# Patient Record
Sex: FEMALE | Race: WHITE | NOT HISPANIC OR LATINO | Employment: OTHER | URBAN - METROPOLITAN AREA
[De-identification: names, ages, dates, MRNs, and addresses within clinical notes are randomized per-mention and may not be internally consistent; named-entity substitution may affect disease eponyms.]

---

## 2018-09-24 ENCOUNTER — APPOINTMENT (OUTPATIENT)
Dept: WOMENS IMAGING | Facility: HOSPITAL | Age: 68
End: 2018-09-24

## 2018-09-24 PROCEDURE — 77067 SCR MAMMO BI INCL CAD: CPT | Performed by: RADIOLOGY

## 2018-09-24 PROCEDURE — 77063 BREAST TOMOSYNTHESIS BI: CPT | Performed by: RADIOLOGY

## 2018-10-11 ENCOUNTER — TELEPHONE (OUTPATIENT)
Dept: GASTROENTEROLOGY | Facility: CLINIC | Age: 68
End: 2018-10-11

## 2018-10-11 ENCOUNTER — OFFICE VISIT (OUTPATIENT)
Dept: GASTROENTEROLOGY | Facility: CLINIC | Age: 68
End: 2018-10-11

## 2018-10-11 VITALS
HEIGHT: 60 IN | TEMPERATURE: 99 F | BODY MASS INDEX: 30.04 KG/M2 | SYSTOLIC BLOOD PRESSURE: 132 MMHG | WEIGHT: 153 LBS | DIASTOLIC BLOOD PRESSURE: 74 MMHG

## 2018-10-11 DIAGNOSIS — R10.11 RUQ PAIN: Primary | ICD-10-CM

## 2018-10-11 DIAGNOSIS — Z79.1 LONG TERM (CURRENT) USE OF NON-STEROIDAL ANTI-INFLAMMATORIES (NSAID): ICD-10-CM

## 2018-10-11 LAB
ALBUMIN SERPL-MCNC: 4.9 G/DL (ref 3.5–5.2)
ALBUMIN/GLOB SERPL: 2.7 G/DL
ALP SERPL-CCNC: 83 U/L (ref 39–117)
ALT SERPL-CCNC: 19 U/L (ref 1–33)
AST SERPL-CCNC: 19 U/L (ref 1–32)
BASOPHILS # BLD AUTO: 0.06 10*3/MM3 (ref 0–0.2)
BASOPHILS NFR BLD AUTO: 1 % (ref 0–1.5)
BILIRUB SERPL-MCNC: 0.4 MG/DL (ref 0.1–1.2)
BUN SERPL-MCNC: 15 MG/DL (ref 8–23)
BUN/CREAT SERPL: 27.8 (ref 7–25)
CALCIUM SERPL-MCNC: 10 MG/DL (ref 8.6–10.5)
CHLORIDE SERPL-SCNC: 102 MMOL/L (ref 98–107)
CO2 SERPL-SCNC: 25.5 MMOL/L (ref 22–29)
CREAT SERPL-MCNC: 0.54 MG/DL (ref 0.57–1)
EOSINOPHIL # BLD AUTO: 0.24 10*3/MM3 (ref 0–0.7)
EOSINOPHIL NFR BLD AUTO: 4.2 % (ref 0.3–6.2)
ERYTHROCYTE [DISTWIDTH] IN BLOOD BY AUTOMATED COUNT: 12.3 % (ref 11.7–13)
GLOBULIN SER CALC-MCNC: 1.8 GM/DL
GLUCOSE SERPL-MCNC: 96 MG/DL (ref 65–99)
HCT VFR BLD AUTO: 41.3 % (ref 35.6–45.5)
HGB BLD-MCNC: 13.1 G/DL (ref 11.9–15.5)
IMM GRANULOCYTES # BLD: 0.03 10*3/MM3 (ref 0–0.03)
IMM GRANULOCYTES NFR BLD: 0.5 % (ref 0–0.5)
LIPASE SERPL-CCNC: 48 U/L (ref 13–60)
LYMPHOCYTES # BLD AUTO: 1.4 10*3/MM3 (ref 0.9–4.8)
LYMPHOCYTES NFR BLD AUTO: 24.3 % (ref 19.6–45.3)
MCH RBC QN AUTO: 28.8 PG (ref 26.9–32)
MCHC RBC AUTO-ENTMCNC: 31.7 G/DL (ref 32.4–36.3)
MCV RBC AUTO: 90.8 FL (ref 80.5–98.2)
MONOCYTES # BLD AUTO: 0.45 10*3/MM3 (ref 0.2–1.2)
MONOCYTES NFR BLD AUTO: 7.8 % (ref 5–12)
NEUTROPHILS # BLD AUTO: 3.58 10*3/MM3 (ref 1.9–8.1)
NEUTROPHILS NFR BLD AUTO: 62.2 % (ref 42.7–76)
PLATELET # BLD AUTO: 269 10*3/MM3 (ref 140–500)
POTASSIUM SERPL-SCNC: 4.5 MMOL/L (ref 3.5–5.2)
PROT SERPL-MCNC: 6.7 G/DL (ref 6–8.5)
RBC # BLD AUTO: 4.55 10*6/MM3 (ref 3.9–5.2)
SODIUM SERPL-SCNC: 142 MMOL/L (ref 136–145)
WBC # BLD AUTO: 5.76 10*3/MM3 (ref 4.5–10.7)

## 2018-10-11 PROCEDURE — 99204 OFFICE O/P NEW MOD 45 MIN: CPT | Performed by: INTERNAL MEDICINE

## 2018-10-11 RX ORDER — OMEGA-3S/DHA/EPA/FISH OIL/D3 300MG-1000
800 CAPSULE ORAL DAILY
COMMUNITY

## 2018-10-11 RX ORDER — CETIRIZINE HYDROCHLORIDE 5 MG/1
5 TABLET ORAL
COMMUNITY

## 2018-10-11 RX ORDER — SUCRALFATE ORAL 1 G/10ML
1 SUSPENSION ORAL 4 TIMES DAILY
Qty: 1200 ML | Refills: 1 | Status: SHIPPED | OUTPATIENT
Start: 2018-10-11 | End: 2018-10-22 | Stop reason: HOSPADM

## 2018-10-11 RX ORDER — FENOFIBRATE 54 MG/1
54 TABLET ORAL
COMMUNITY
Start: 2018-03-07

## 2018-10-11 RX ORDER — GABAPENTIN 800 MG/1
400 TABLET ORAL
COMMUNITY
Start: 2018-09-25 | End: 2019-09-25

## 2018-10-11 NOTE — PROGRESS NOTES
"Chief Complaint   Patient presents with   • Abdominal Pain     Denis Lowe is a 68 y.o. female who presents with abdominal pain. This started 2 weeks ago.  Began at rest.  Started in her back and radiated to the ruq and down the right side.  Nothing seems to exacerbate it - it just hurts.  Worse as the day goes on.  A little worse with eating.  Doesn't feel like acid reflux.    U/s [facility] showed left ovarian cyst.  She takes a lot of ibuprofen.  No change in BM - no blood in stool.  HPI  Past Medical History:   Diagnosis Date   • Allergic rhinitis    • Family hx of colon cancer    • Sciatica      Past Surgical History:   Procedure Laterality Date   • COLONOSCOPY  approx 2016    normal per patient-repeat 10 years   • HYSTERECTOMY     • KNEE SURGERY Right    • SHOULDER SURGERY Right    • WISDOM TOOTH EXTRACTION         Current Outpatient Prescriptions:   •  calcium carbonate-cholecalciferol 500-400 MG-UNIT tablet tablet, Take  by mouth Daily., Disp: , Rfl:   •  cetirizine (zyrTEC) 5 MG tablet, Take 5 mg by mouth., Disp: , Rfl:   •  cholecalciferol (VITAMIN D3) 400 units tablet, Take 800 Units by mouth Daily., Disp: , Rfl:   •  fenofibrate (TRICOR) 54 MG tablet, Take 54 mg by mouth., Disp: , Rfl:   •  gabapentin (NEURONTIN) 800 MG tablet, Take 400 mg by mouth., Disp: , Rfl:   •  sucralfate (CARAFATE) 1 GM/10ML suspension, Take 10 mL by mouth 4 (Four) Times a Day., Disp: 1200 mL, Rfl: 1  •  VITAMIN D, CHOLECALCIFEROL, PO, Take  by mouth., Disp: , Rfl:   Allergies   Allergen Reactions   • Codeine Other (See Comments)     \"lung spasms\"   • Penicillins Rash     Social History     Social History   • Marital status: Unknown     Spouse name: N/A   • Number of children: N/A   • Years of education: N/A     Occupational History   • Not on file.     Social History Main Topics   • Smoking status: Former Smoker     Packs/day: 0.50     Types: Cigarettes     Start date: 1973     Quit date: 1985   • Smokeless tobacco: Never " Used   • Alcohol use 1.2 oz/week     2 Shots of liquor per week   • Drug use: No   • Sexual activity: Not on file     Other Topics Concern   • Not on file     Social History Narrative   • No narrative on file     Family History   Problem Relation Age of Onset   • Colon cancer Mother    • Bone cancer Brother    • Ulcerative colitis Other    • Lymphoma Brother    • Cancer Daughter    • Cancer Son      Review of Systems   Constitutional: Negative for appetite change and unexpected weight change.   Gastrointestinal: Positive for abdominal pain. Negative for blood in stool, constipation, diarrhea, nausea and vomiting.   Musculoskeletal: Positive for back pain.   All other systems reviewed and are negative.    Vitals:    10/11/18 0837   BP: 132/74   Temp: 99 °F (37.2 °C)     1    10/11/18  0837   Weight: 69.4 kg (153 lb)     Physical Exam   Constitutional: She appears well-developed and well-nourished.   HENT:   Head: Normocephalic and atraumatic.   Eyes: No scleral icterus.   Abdominal: Soft. She exhibits no distension and no mass. There is tenderness.       Neurological: She is alert.   Skin: Skin is warm and dry.   Psychiatric: She has a normal mood and affect.     No images are attached to the encounter.  No notes on file  Denis was seen today for abdominal pain.    Diagnoses and all orders for this visit:    RUQ pain  -     Case Request; Standing  -     Case Request  -     CBC & Differential  -     Comprehensive Metabolic Panel  -     Lipase    Long term (current) use of non-steroidal anti-inflammatories (nsaid)    Other orders  -     sucralfate (CARAFATE) 1 GM/10ML suspension; Take 10 mL by mouth 4 (Four) Times a Day.  -     Follow Anesthesia Guidelines / Standing Orders; Future  -     Implement Anesthesia orders day of procedure.; Standing  -     Obtain informed consent; Standing    Plan:  - Patient advised to stop NSAIDs and use Tylenol  -Request ultrasound from [facility]  -Labs today  -Trial Carafate  and will plan for an EGD for further evaluation to rule out peptic ulcer disease related to NSAIDs.  If negative she will need a CT scan of abdomen

## 2018-10-22 ENCOUNTER — HOSPITAL ENCOUNTER (OUTPATIENT)
Facility: HOSPITAL | Age: 68
Setting detail: HOSPITAL OUTPATIENT SURGERY
Discharge: HOME | End: 2018-10-22
Attending: INTERNAL MEDICINE | Admitting: INTERNAL MEDICINE

## 2018-10-22 ENCOUNTER — ANESTHESIA EVENT (OUTPATIENT)
Dept: GASTROENTEROLOGY | Facility: HOSPITAL | Age: 68
End: 2018-10-22

## 2018-10-22 ENCOUNTER — ANESTHESIA (OUTPATIENT)
Dept: GASTROENTEROLOGY | Facility: HOSPITAL | Age: 68
End: 2018-10-22

## 2018-10-22 VITALS
DIASTOLIC BLOOD PRESSURE: 83 MMHG | WEIGHT: 150.3 LBS | TEMPERATURE: 97.9 F | HEIGHT: 60 IN | OXYGEN SATURATION: 99 % | HEART RATE: 70 BPM | RESPIRATION RATE: 16 BRPM | BODY MASS INDEX: 29.51 KG/M2 | SYSTOLIC BLOOD PRESSURE: 122 MMHG

## 2018-10-22 DIAGNOSIS — R10.11 RUQ PAIN: ICD-10-CM

## 2018-10-22 PROCEDURE — 25010000002 PROPOFOL 10 MG/ML EMULSION: Performed by: ANESTHESIOLOGY

## 2018-10-22 PROCEDURE — 88312 SPECIAL STAINS GROUP 1: CPT | Performed by: INTERNAL MEDICINE

## 2018-10-22 PROCEDURE — S0260 H&P FOR SURGERY: HCPCS | Performed by: INTERNAL MEDICINE

## 2018-10-22 PROCEDURE — 43239 EGD BIOPSY SINGLE/MULTIPLE: CPT | Performed by: INTERNAL MEDICINE

## 2018-10-22 PROCEDURE — 88305 TISSUE EXAM BY PATHOLOGIST: CPT | Performed by: INTERNAL MEDICINE

## 2018-10-22 RX ORDER — PROPOFOL 10 MG/ML
VIAL (ML) INTRAVENOUS AS NEEDED
Status: DISCONTINUED | OUTPATIENT
Start: 2018-10-22 | End: 2018-10-22 | Stop reason: SURG

## 2018-10-22 RX ORDER — LIDOCAINE HYDROCHLORIDE 20 MG/ML
INJECTION, SOLUTION INFILTRATION; PERINEURAL AS NEEDED
Status: DISCONTINUED | OUTPATIENT
Start: 2018-10-22 | End: 2018-10-22 | Stop reason: SURG

## 2018-10-22 RX ORDER — SUCRALFATE ORAL 1 G/10ML
1 SUSPENSION ORAL 4 TIMES DAILY
Qty: 1200 ML | Refills: 0 | Status: SHIPPED | OUTPATIENT
Start: 2018-10-22 | End: 2018-11-02 | Stop reason: ALTCHOICE

## 2018-10-22 RX ORDER — SUCRALFATE 1 G/1
1 TABLET ORAL
Status: DISCONTINUED | OUTPATIENT
Start: 2018-10-22 | End: 2018-10-22 | Stop reason: HOSPADM

## 2018-10-22 RX ORDER — OMEPRAZOLE 20 MG/1
20 CAPSULE, DELAYED RELEASE ORAL 2 TIMES DAILY
Qty: 60 CAPSULE | Refills: 1 | Status: SHIPPED | OUTPATIENT
Start: 2018-10-22 | End: 2018-12-21

## 2018-10-22 RX ORDER — SODIUM CHLORIDE, SODIUM LACTATE, POTASSIUM CHLORIDE, CALCIUM CHLORIDE 600; 310; 30; 20 MG/100ML; MG/100ML; MG/100ML; MG/100ML
1000 INJECTION, SOLUTION INTRAVENOUS CONTINUOUS
Status: DISCONTINUED | OUTPATIENT
Start: 2018-10-22 | End: 2018-10-22 | Stop reason: HOSPADM

## 2018-10-22 RX ADMIN — PROPOFOL 150 MG: 10 INJECTION, EMULSION INTRAVENOUS at 14:17

## 2018-10-22 RX ADMIN — LIDOCAINE HYDROCHLORIDE 50 MG: 20 INJECTION, SOLUTION INFILTRATION; PERINEURAL at 14:17

## 2018-10-22 RX ADMIN — PROPOFOL 20 MG: 10 INJECTION, EMULSION INTRAVENOUS at 14:25

## 2018-10-22 RX ADMIN — PROPOFOL 100 MG: 10 INJECTION, EMULSION INTRAVENOUS at 14:21

## 2018-10-22 RX ADMIN — SODIUM CHLORIDE, POTASSIUM CHLORIDE, SODIUM LACTATE AND CALCIUM CHLORIDE 1000 ML: 600; 310; 30; 20 INJECTION, SOLUTION INTRAVENOUS at 13:50

## 2018-10-22 NOTE — H&P
[facility]  Pre Procedure History & Physical    Chief Complaint:   Abdominal pain    Subjective     HPI:   Denis Lowe is a 68 y.o. female who presents with abdominal pain. This started 2 weeks ago.  Began at rest.  Started in her back and radiated to the ruq and down the right side.  Nothing seems to exacerbate it - it just hurts.  Worse as the day goes on.  A little worse with eating.  Doesn't feel like acid reflux.    U/[facility] showed left ovarian cyst.  She takes a lot of ibuprofen.  No change in BM - no blood in stool.    Past Medical History:   Past Medical History:   Diagnosis Date   • Allergic rhinitis    • Elevated cholesterol    • Family hx of colon cancer    • Sciatica        Past Surgical History:  Past Surgical History:   Procedure Laterality Date   • COLONOSCOPY  approx 2016    normal per patient-repeat 10 years   • HYSTERECTOMY     • KNEE SURGERY Right    • SHOULDER SURGERY Right    • WISDOM TOOTH EXTRACTION         Family History:  Family History   Problem Relation Age of Onset   • Colon cancer Mother    • Bone cancer Brother    • Ulcerative colitis Other    • Lymphoma Brother    • Cancer Daughter    • Cancer Son        Social History:   reports that she quit smoking about 33 years ago. Her smoking use included Cigarettes. She started smoking about 45 years ago. She smoked 0.50 packs per day. She has never used smokeless tobacco. She reports that she drinks about 1.2 oz of alcohol per week . She reports that she does not use drugs.    Medications:   Prescriptions Prior to Admission   Medication Sig Dispense Refill Last Dose   • cetirizine (zyrTEC) 5 MG tablet Take 5 mg by mouth.   Past Week at Unknown time   • cholecalciferol (VITAMIN D3) 400 units tablet Take 800 Units by mouth Daily.   Past Month at Unknown time   • fenofibrate (TRICOR) 54 MG tablet Take 54 mg by mouth.   10/21/2018 at Unknown time   • gabapentin (NEURONTIN) 800 MG tablet Take 400 mg by mouth.    "10/21/2018 at Unknown time   • VITAMIN D, CHOLECALCIFEROL, PO Take  by mouth.   Past Week at Unknown time   • calcium carbonate-cholecalciferol 500-400 MG-UNIT tablet tablet Take  by mouth Daily.   10/20/2018   • sucralfate (CARAFATE) 1 GM/10ML suspension Take 10 mL by mouth 4 (Four) Times a Day. 1200 mL 1 Unknown at Unknown time       Allergies:  Codeine and Penicillins    ROS:    Pertinent items are noted in HPI, all other systems reviewed and negative     Objective     Blood pressure (!) 185/82, pulse 82, temperature 97.6 °F (36.4 °C), temperature source Oral, resp. rate 18, height 152.4 cm (60\"), weight 68.2 kg (150 lb 4.8 oz), SpO2 98 %.    Physical Exam   Constitutional: Pt is oriented to person, place, and time and well-developed, well-nourished, and in no distress.   Mouth/Throat: Oropharynx is clear and moist.   Neck: Normal range of motion.   Cardiovascular: Normal rate, regular rhythm      Pulmonary/Chest: Effort normal     Abdominal: Soft. Nontender  Skin: Skin is warm and dry.   Psychiatric: Mood, memory, affect and judgment normal.     Assessment/Plan     Diagnosis:  Abdominal pain    Anticipated Surgical Procedure:  egd    The risks, benefits, and alternatives of this procedure have been discussed with the patient or the responsible party- the patient understands and agrees to proceed.                                                            "

## 2018-10-22 NOTE — ANESTHESIA PREPROCEDURE EVALUATION
Anesthesia Evaluation     Patient summary reviewed and Nursing notes reviewed   no history of anesthetic complications:  NPO Solid Status: > 8 hours  NPO Liquid Status: > 6 hours           Airway   Mallampati: II  TM distance: >3 FB  Neck ROM: full  No difficulty expected  Dental      Comment: Permanent [place]    Pulmonary - negative pulmonary ROS and normal exam    breath sounds clear to auscultation  Cardiovascular - normal exam    Rhythm: regular  Rate: normal    (+) hyperlipidemia,       Neuro/Psych    ROS Comment: sciatica  GI/Hepatic/Renal/Endo      Musculoskeletal         ROS comment: sciatica  Abdominal  - normal exam   Substance History - negative use     OB/GYN negative ob/gyn ROS         Other - negative ROS                     Anesthesia Plan    ASA 2     MAC     intravenous induction   Anesthetic plan, all risks, benefits, and alternatives have been provided, discussed and informed consent has been obtained with: patient.

## 2018-10-22 NOTE — ANESTHESIA POSTPROCEDURE EVALUATION
Patient: Denis Lowe    Procedure Summary     Date:  10/22/18 Room / Location:   [facility]    Anesthesia Start:  1411 Anesthesia Stop:  1436    Procedure:  ESOPHAGOGASTRODUODENOSCOPY WITH COLD BIOPSIES (N/A Esophagus) Diagnosis:       RUQ pain      (RUQ pain [R10.11])    Surgeon:  Laure Hollins MD Provider:  Ruma Morales MD    Anesthesia Type:  MAC ASA Status:  2          Anesthesia Type: MAC  Last vitals  BP   101/68 (10/22/18 1437)   Temp   36.4 °C (97.6 °F) (10/22/18 1317)   Pulse   73 (10/22/18 1437)   Resp   16 (10/22/18 1437)     SpO2   98 % (10/22/18 1437)     Post Anesthesia Care and Evaluation    Patient location during evaluation: PACU  Patient participation: complete - patient participated  Level of consciousness: awake and alert  Pain management: adequate  Airway patency: patent  Anesthetic complications: No anesthetic complications  PONV Status: none  Cardiovascular status: acceptable  Respiratory status: acceptable  Hydration status: acceptable

## 2018-10-22 NOTE — DISCHARGE INSTRUCTIONS
For the next 24 hours patient needs to be with a responsible adult.    For 24 hours DO NOT drive, operate machinery, appliances, drink alcohol, make important decisions or sign legal documents.    Start with a light or bland diet and advance to regular diet as tolerated.    Follow recommendations on procedure report provided by your doctor.    Call Dr Hollins for problems [phone] and for biopsy results in 7-10 days    Problems may include but not limited to: large amounts of bleeding, trouble breathing, repeated vomiting, severe unrelieved pain, fever or chills.

## 2018-10-23 LAB
CYTO UR: NORMAL
LAB AP CASE REPORT: NORMAL
PATH REPORT.FINAL DX SPEC: NORMAL
PATH REPORT.GROSS SPEC: NORMAL

## 2018-11-01 ENCOUNTER — TELEPHONE (OUTPATIENT)
Dept: GASTROENTEROLOGY | Facility: CLINIC | Age: 68
End: 2018-11-01

## 2018-11-01 NOTE — TELEPHONE ENCOUNTER
Bx show inflammation in the duodenum - normal gastric bx - cont meds as prescribed at time of egd - office f/u with criss 4-6 weeks

## 2018-11-02 RX ORDER — SUCRALFATE 1 G/1
1 TABLET ORAL 4 TIMES DAILY
Qty: 120 TABLET | Refills: 0 | Status: SHIPPED | OUTPATIENT
Start: 2018-11-02

## 2018-11-02 NOTE — TELEPHONE ENCOUNTER
Call to pt.  Advise per Dr Hollins that bx show inflammation in the duodenum.  Normal gastric bx.  Continues meds as prescribed at time of egd.  Office f/u in 4-6 weeks.    Pt verb understanding.  States did not fill sucralfate elixir because too expensive.  Advise that will change to tabs - may crush and mix with water.  This much less expensive.  Pt agreeable.      F/u appt scheduled with TYRONE Banegas for 12/7 @ 1:30 pm.    Escribe completed for Sucralfate tabs 1 gm - crush and mix with 10 ml water.  Take 4x/day for 30 days, #120, R0.

## 2019-09-25 ENCOUNTER — APPOINTMENT (OUTPATIENT)
Dept: WOMENS IMAGING | Facility: HOSPITAL | Age: 69
End: 2019-09-25

## 2019-09-25 PROCEDURE — 77067 SCR MAMMO BI INCL CAD: CPT | Performed by: RADIOLOGY

## 2021-08-03 ENCOUNTER — APPOINTMENT (OUTPATIENT)
Dept: WOMENS IMAGING | Facility: HOSPITAL | Age: 71
End: 2021-08-03

## 2021-08-03 PROCEDURE — 77067 SCR MAMMO BI INCL CAD: CPT | Performed by: RADIOLOGY

## 2021-08-03 PROCEDURE — 77063 BREAST TOMOSYNTHESIS BI: CPT | Performed by: RADIOLOGY

## 2024-02-15 ENCOUNTER — OFFICE VISIT (OUTPATIENT)
Dept: ORTHOPEDIC SURGERY | Facility: CLINIC | Age: 74
End: 2024-02-15
Payer: MEDICARE

## 2024-02-15 VITALS — HEIGHT: 60 IN | BODY MASS INDEX: 29.61 KG/M2 | TEMPERATURE: 97.3 F | WEIGHT: 150.8 LBS

## 2024-02-15 DIAGNOSIS — M72.2 PLANTAR FASCIITIS: Primary | ICD-10-CM

## 2024-02-15 DIAGNOSIS — M79.672 FOOT PAIN, LEFT: ICD-10-CM

## 2024-02-15 DIAGNOSIS — M65.28 CALCIFIC ACHILLES TENDONITIS: ICD-10-CM

## 2024-02-15 PROCEDURE — 99204 OFFICE O/P NEW MOD 45 MIN: CPT | Performed by: ORTHOPAEDIC SURGERY

## 2024-02-15 RX ORDER — GABAPENTIN 400 MG/1
400 CAPSULE ORAL 3 TIMES DAILY
COMMUNITY

## 2024-02-15 RX ORDER — LANOLIN ALCOHOL/MO/W.PET/CERES
1000 CREAM (GRAM) TOPICAL DAILY
COMMUNITY

## 2024-02-15 RX ORDER — LISINOPRIL 10 MG/1
10 TABLET ORAL DAILY
COMMUNITY
Start: 2023-08-09 | End: 2024-08-08

## 2024-03-13 ENCOUNTER — TREATMENT (OUTPATIENT)
Age: 74
End: 2024-03-13
Payer: MEDICARE

## 2024-03-13 DIAGNOSIS — R29.898 ANKLE WEAKNESS: ICD-10-CM

## 2024-03-13 DIAGNOSIS — M72.2 PLANTAR FASCIITIS OF LEFT FOOT: ICD-10-CM

## 2024-03-13 DIAGNOSIS — M79.672 PAIN IN LEFT FOOT: Primary | ICD-10-CM

## 2024-03-13 NOTE — PROGRESS NOTES
Physical Therapy Initial Evaluation and Plan of Care  [address]  [facility] [address]  P: [phone]  F: [phone]    Patient: Denis Lowe   : 1950  Diagnosis/ICD-10 Code:  Pain in left foot [M79.672]  Referring practitioner: Solis Wallace*  Date of Initial Visit: 3/13/2024  Today's Date: 3/13/2024  Patient seen for 1 session         Visit Diagnoses:    ICD-10-CM ICD-9-CM   1. Pain in left foot  M79.672 729.5   2. Plantar fasciitis of left foot  M72.2 728.71   3. Ankle weakness  R29.898 719.67         Subjective Questionnaire: LEFS:       Subjective Evaluation    History of Present Illness  Mechanism of injury: Pt is a 72 y/o female who presents to physical therapy with complaints of L heel and foot pain that began in January and it started all of a sudden and she doesn't recall a DAPHNIE. Pt reports that she had a similar issue 5 years ago and that she had an injection which helped. She wants to avoid another injection. She reports pain first thing in the morning, if she sits for a while, or if she stands for a long time. In addition, she reports she has pain if she walks on it. Pt reports that with time and doing her stretches since seeing her doctor she is starting to feel better, her pain used to be a 10/10 and now she reports it ranges from a 2-6/10.      Patient Occupation: Part Time- [employer]  Current pain ratin  At worst pain ratin  Quality: dull ache, tight and sharp  Relieving factors: ice, medications, rest and change in position  Aggravating factors: stairs, standing, movement, ambulation and prolonged positioning    Social Support  Lives in: multiple-level home    Diagnostic Tests  Abnormal x-ray: flattening of the second metatarsal head.  There are some arthritis at the second and third tarsometatarsal joints.There is small spur at the insertion of the Achilles and plantarly.A small loose ossific fragment in far posterior  [facility].    Treatments  Current treatment: physical therapy  Patient Goals  Patient goals for therapy: increased strength, independence with ADLs/IADLs, increased motion, improved balance and decreased pain             Objective          Tenderness   Left Ankle/Foot   Tenderness in the Achilles insertion, lateral malleolus, medial calcaneus and plantar fascia.     Neurological Testing     Sensation     Ankle/Foot   Left Ankle/Foot   Intact: light touch    Right Ankle/Foot   Intact: light touch     Active Range of Motion   Left Ankle/Foot   Plantar flexion: WFL  Inversion: 30 degrees   Eversion: 6 degrees     Right Ankle/Foot   Normal active range of motion    Additional Active Range of Motion Details  DF to neutral on L    Passive Range of Motion   Left Ankle/Foot    Plantar flexion: WFL  Inversion: 34 degrees   Eversion: 8 degrees     Additional Passive Range of Motion Details  DF to just past neutral on L    Strength/Myotome Testing     Left Ankle/Foot   Dorsiflexion: 5  Plantar flexion: 4  Inversion: 3+  Eversion: 3+    Right Ankle/Foot   Dorsiflexion: 5  Plantar flexion: 5  Inversion: 5  Eversion: 5    Tests   Left Ankle/Foot   Positive for calcaneal squeeze.     Ambulation   Weight-Bearing Status   Weight-Bearing Status (Left): full weight bearing   Weight-Bearing Status (Right): full weight-bearing      Ambulation: Level Surfaces   Ambulation without assistive device: independent    Ambulation: Stairs   Ascend stairs: independent  Pattern: non-reciprocal  Railings: one rail  Descend stairs: independent  Pattern: non-reciprocal  Railings: one rail    Observational Gait   Gait: antalgic   Increased right stance time and left swing time. Decreased walking speed, stride length, left stance time and right swing time.           Assessment & Plan       Assessment  Impairments: abnormal gait, abnormal muscle tone, abnormal or restricted ROM, activity intolerance, impaired balance, impaired physical  strength, lacks appropriate home exercise program, pain with function and weight-bearing intolerance   Functional limitations: carrying objects, lifting, walking, uncomfortable because of pain, standing and unable to perform repetitive tasks   Assessment details: Pt is a 72 y/o female who presents to physical therapy with signs and symptoms consistent with L foot/heel pain most consistent with plantar fasciitis. Pt has decreased L ankle ROM, with the most limitation in ankle dorsiflexion and eversion. In addition, pt has L LE strength deficits which limits her ability to perform her ADLs/IADLs pain free. Pt has pain and difficulty performing prolonged standing, prolonged sitting, and walking. Pt would benefit from skilled physical therapy in order to address the above impairments and activity limitations outlined in this initial evaluation, in order to decrease pain, improve functional mobility, and return to PLOF.   Prognosis: good    Goals  Plan Goals: STG 2-4 weeks    1. LEFS score to increase by 10 points in order to show improvement in overall functional activities.   2. Pt will be able to improve ankle ROM to 5 degrees or greater of L ankle dorsiflexion in order to improve gait and stair negotiation  3. Pt will be independent in home exercise program    LTG 4-8 weeks    1. Pt will be able to ambulate 1.0-1.5 mph for 1/4 mile pain free and with improved stance time on L foot  2. Pt will improve L ankle MMT to 4+/5 or greater to improve stability when ambulating on level and uneven surfaces  3. Pt will report pain free sitting for up to one hour while reading   4. LEFS score to increase by 15 points in order to show improvement in overall functional activities.       Plan  Therapy options: will be seen for skilled therapy services  Planned modality interventions: cryotherapy, dry needling, iontophoresis and ultrasound  Planned therapy interventions: balance/weight-bearing training, flexibility, functional ROM  exercises, ADL retraining, home exercise program, gait training, IADL retraining, joint mobilization, manual therapy, neuromuscular re-education, soft tissue mobilization, strengthening, stretching and therapeutic activities  Frequency: 1x week  Duration in weeks: 8  Treatment plan discussed with: patient  Plan details: 1-2 x a week for 6-8 weeks            Timed:         Manual Therapy:    15     mins  98162;     Therapeutic Exercise:    10     mins  25342;     Neuromuscular Gifty:    0    mins  46477;    Therapeutic Activity:     0     mins  05501;     Gait Trainin     mins  84443;     Ultrasound:     0     mins  71821;    Ionto                               0    mins   43317  Self Care                       0     mins   84044  Canalith Repos    0     mins 95621      Un-Timed:  Electrical Stimulation:    0     mins  52387 ( );  Dry Needling     0     mins self-pay  Traction     0     mins 72032        Timed Treatment:   25   mins   Total Treatment:     45   mins          PT: Suzy Torres PT     License Number: [id number]  Electronically signed by Suzy Torres PT, 24, 11:25 AM EDT    Certification Period: 3/13/2024 thru 6/10/2024  I certify that the therapy services are furnished while this patient is under my care.  The services outlined above are required by this patient, and will be reviewed every 90 days.         Physician Signature:__________________________________________________    PHYSICIAN: Solis Wallace MD  NPI: [id number]                                      DATE:      Please sign and return via fax to [phone]  Thank you, [facility].

## 2024-03-27 ENCOUNTER — TREATMENT (OUTPATIENT)
Age: 74
End: 2024-03-27
Payer: MEDICARE

## 2024-03-27 DIAGNOSIS — M79.672 PAIN IN LEFT FOOT: Primary | ICD-10-CM

## 2024-03-27 DIAGNOSIS — R29.898 ANKLE WEAKNESS: ICD-10-CM

## 2024-03-27 DIAGNOSIS — M72.2 PLANTAR FASCIITIS OF LEFT FOOT: ICD-10-CM

## 2024-03-27 PROCEDURE — 97535 SELF CARE MNGMENT TRAINING: CPT | Performed by: PHYSICAL THERAPIST

## 2024-03-27 PROCEDURE — 97110 THERAPEUTIC EXERCISES: CPT | Performed by: PHYSICAL THERAPIST

## 2024-03-27 PROCEDURE — 97140 MANUAL THERAPY 1/> REGIONS: CPT | Performed by: PHYSICAL THERAPIST

## 2024-03-27 PROCEDURE — 97530 THERAPEUTIC ACTIVITIES: CPT | Performed by: PHYSICAL THERAPIST

## 2024-04-01 NOTE — PROGRESS NOTES
Physical Therapy Daily Treatment Note    [facility]  [address]  [place] [address]     Patient: Denis Lowe   : 1950  Referring practitioner: Solis Wallace*  Date of Initial Visit: Type: THERAPY  Noted: 3/13/2024  Today's Date 24  Patient seen for 2 sessions         Denis Lowe reports:         Subjective     Objective   See Exercise, Manual, and Modality Logs for complete treatment.   Exercise rationale/ pain free exercise performance  Anatomy and structure of affected musculature  Shoe wear/support  Ice application  Sleeping positions with pillows  Alternate exercise positions  Verbal/Tactile cues to ensure correct exercise performance/technique    Added:Nu Step, ROM, stretches,      Assessment/Plan  Compliant/Cooperative with rehab efforts this session.  Subjectively reports no increased symptoms or discomfort with therapeutic exercise today.  Able to perform increased exercise/functional activity without increased L foot/ankle discomfort.  Benefits from verbal/tactile cues to ensure proper exercise technique, performance and positoining.          Progress per Plan of Care and Progress strengthening /stabilization /functional activity           Timed:  Manual Therapy:  15       mins  63553;  Therapeutic Exercise:    20     mins  52931;     Neuromuscular Gifty:        mins  60511;    Therapeutic Activity:     10     mins  61038;     Gait Training:           mins  57052;     Ultrasound:          mins  74607;    Self Care                    _12__      mins 75543    Untimed:  Electrical Stimulation:         mins  68998 ( );  Mechanical Traction:         mins  91903;     Timed Treatment:   57   mins   Total Treatment:    57    mins  Parth Antonio PTA  Physical Therapist  Assistant  [id number]

## 2024-04-02 ENCOUNTER — TREATMENT (OUTPATIENT)
Age: 74
End: 2024-04-02
Payer: MEDICARE

## 2024-04-02 DIAGNOSIS — R29.898 ANKLE WEAKNESS: ICD-10-CM

## 2024-04-02 DIAGNOSIS — M79.672 PAIN IN LEFT FOOT: Primary | ICD-10-CM

## 2024-04-02 DIAGNOSIS — M72.2 PLANTAR FASCIITIS OF LEFT FOOT: ICD-10-CM

## 2024-04-02 NOTE — PROGRESS NOTES
Physical Therapy Daily Treatment Note  [address]  [facility] [address]  P: [phone]  F: [phone]    Patient: Denis Lowe   : 1950  Referring practitioner: Solis Wallace*  Date of Initial Visit: Type: THERAPY  Noted: 3/13/2024  Today's Date: 2024  Patient seen for 3 sessions       Visit Diagnoses:    ICD-10-CM ICD-9-CM   1. Pain in left foot  M79.672 729.5   2. Plantar fasciitis of left foot  M72.2 728.71   3. Ankle weakness  R29.898 719.67       Denis Lowe reports:     Subjective   Pt reports that her foot is feeling much better, but that she recently went to a wedding and had to go up and down several stairs. Pt reports that her left knee is sore today.   Objective   See Exercise, Manual, and Modality Logs for complete treatment.       Assessment/Plan  Pt had difficulty tolerating any plantarflexion based activities for left foot due to c/o left knee pain. Did not perform these interventions as to not exacerbate her knee. Pt tolerated remainder of interventions well with no complaints of L foot pain. Instructed to continue to perform home exercise program and perform stretches in seated position if she has difficulty performing in standing. Continue POC.     Timed:         Manual Therapy:    15     mins  57710;     Therapeutic Exercise:    15     mins  56312;     Neuromuscular Gifty:    0    mins  84681;    Therapeutic Activity:     9     mins  93806;     Gait Trainin     mins  06450;     Ultrasound:     0     mins  40328;    Ionto                               0    mins   19797  Self Care                       0     mins   36000  Canalith Repos    0     mins 72917      Un-Timed:  Electrical Stimulation:    0     mins  69302 ( );  Dry Needling     0     mins self-pay  Traction     0     mins 19688      Timed Treatment:   39   mins   Total Treatment:     39   mins    Suzy Torres PT  [place] License: [id number]

## 2024-04-09 ENCOUNTER — TREATMENT (OUTPATIENT)
Age: 74
End: 2024-04-09
Payer: MEDICARE

## 2024-04-09 DIAGNOSIS — M79.672 PAIN IN LEFT FOOT: Primary | ICD-10-CM

## 2024-04-09 DIAGNOSIS — M72.2 PLANTAR FASCIITIS OF LEFT FOOT: ICD-10-CM

## 2024-04-09 DIAGNOSIS — R29.898 ANKLE WEAKNESS: ICD-10-CM

## 2024-04-09 NOTE — PROGRESS NOTES
"Re-Assessment / Progress Note  [address]  [address]  [facility] [address]  P: [phone]  F: [phone]  Patient: Denis Lowe   : 1950  Diagnosis/ICD-10 Code:  Pain in left foot [M79.672]  Referring practitioner: Solsi Wallace*  Date of Initial Visit: Episode Type: THERAPY  Noted: 3/13/2024    Today's Date: 2024  Patient seen for 4 sessions.    Visit Diagnoses:    ICD-10-CM ICD-9-CM   1. Pain in left foot  M79.672 729.5   2. Plantar fasciitis of left foot  M72.2 728.71   3. Ankle weakness  R29.898 719.67       Subjective:   Denis Lowe reports:   Subjective Questionnaire: LEFS:   Clinical Progress: progressing, but currently worse today due to c/o left knee pain  Home Program Compliance: Yes  Treatment has included: therapeutic exercise, neuromuscular re-education, manual therapy, and therapeutic activity    Subjective   Pt reports that \"there for a while it felt 90%, but today it was back to 40% better\". Pt reports that standing long periods of time is bothersome. Pt reports that her left knee is hurting a lot and she is going to call the doctor about getting this checked out.   Current pain ratin  At worst pain rating: 10  Objective   Tenderness   Left Ankle/Foot   Tenderness to plantar fascia  No TTP to achilles, med/lat malleolus         Active Range of Motion   Left Ankle/Foot   Plantar flexion: WFL  Inversion: 35 degrees   Eversion: 19 degrees         Additional Active Range of Motion Details  DF to neutral on L     Passive Range of Motion   Left Ankle/Foot     Plantar flexion: WFL  Inversion: 40 degrees   Eversion: 23 degrees   Inversion: 40 degrees  Additional Passive Range of Motion Details  DF to 6 degrees with overpressure     Strength/Myotome Testing      Left Ankle/Foot   Dorsiflexion: 5  Plantar flexion: 5  Inversion: 4-  Eversion: 4+       Tests   Left Ankle/Foot   Positive for calcaneal squeeze.         Ambulation: Stairs   Ascend stairs: " independent  Pattern: non-reciprocal  Railings: one rail  Descend stairs: independent  Pattern: non-reciprocal  Railings: one rail     Observational Gait   Gait: antalgic   Significant limp due to c/o left knee pain, decreased stance time on LLE         Assessment/Plan  Pt is a 74 y/o female who has been attending physical therapy for ~1 month for left foot/ankle pain. Pt has made progress in her left ankle range of motion since initial evaluation and has also made improvements in her left ankle strength. She no longer has TTP to medial/lateral malleolus, but continues to have a positive calcaneal squeeze. Pt has been limited in her ability to perform standing interventions due to recent complaints of left knee pain. Pt reports that she went to a wedding and had to do a lot of stairs, and ever since then she has been having knee pain. Pt has significant limp with decreased stance time on the left lower extremity due to c/o L knee pain. LEFS score not reflective of improvements due to this new onset of knee pain, treadmill ambulation not performed/tested today due to her knee pain. She sees MD Thursday for follow up and assessment. She needs continued skilled PT in order to continue to address impairments and activity limitations such as improving her strength and gait in order to improve overall functional mobility.   Progress toward previous goals: Partially Met       Goals  Plan Goals: STG 2-4 weeks     1. LEFS score to increase by 10 points in order to show improvement in overall functional activities. NOT MET  2. Pt will be able to improve ankle ROM to 5 degrees or greater of L ankle dorsiflexion in order to improve gait and stair negotiation MET  3. Pt will be independent in home exercise program MET     LTG 4-8 weeks     1. Pt will be able to ambulate 1.0-1.5 mph for 1/4 mile pain free and with improved stance time on L foot DNT due to knee pain   2. Pt will improve L ankle MMT to 4+/5 or greater to improve  stability when ambulating on level and uneven surfaces progressing  3. Pt will report pain free sitting for up to one hour while reading MET  4. LEFS score to increase by 15 points in order to show improvement in overall functional activities. NOT MET    Recommendations: Continue as planned  Timeframe: 1 month  Prognosis to achieve goals: good    PT Signature: Suzy Sololock, PT  [place] Lic. # [id number]      Based upon review of the patient's progress and continued therapy plan, it is my medical opinion that Denis Lowe should continue physical therapy treatment at [facility] PHYSICAL THERAPY  [address]  [facility] [address] [phone] ; Fax Number [phone]    Signature: __________________________________  Solis Wallace MD    Manual Therapy:     15     mins  71129;  Therapeutic Exercise:     15     mins  74475;     Neuromuscular Gifty:     0    mins  00051;    Therapeutic Activity:      0     mins  05452;     Gait Trainin     mins  22815;     Ultrasound:      0     mins  67940;    Electrical Stimulation:     0     mins  03302 (JORDAN );  Dry Needling      0     mins self-pay  Traction      0     mins 29232  Canalith Repositioning    0     mins 12364      Timed Treatment:   30   mins   Total Treatment:     30   mins

## 2024-04-11 ENCOUNTER — OFFICE VISIT (OUTPATIENT)
Dept: ORTHOPEDIC SURGERY | Facility: CLINIC | Age: 74
End: 2024-04-11
Payer: MEDICARE

## 2024-04-11 VITALS — TEMPERATURE: 96.9 F | HEIGHT: 60 IN | BODY MASS INDEX: 29.92 KG/M2 | WEIGHT: 152.4 LBS

## 2024-04-11 DIAGNOSIS — M72.2 PLANTAR FASCIITIS: ICD-10-CM

## 2024-04-11 DIAGNOSIS — M17.12 PRIMARY LOCALIZED OSTEOARTHROSIS OF LEFT LOWER LEG: Primary | ICD-10-CM

## 2024-04-11 DIAGNOSIS — M65.28 CALCIFIC ACHILLES TENDONITIS: ICD-10-CM

## 2024-04-11 DIAGNOSIS — R52 PAIN: ICD-10-CM

## 2024-04-11 NOTE — PROGRESS NOTES
Ankle Follow Up      Patient: Denis Lowe    YOB: 1950 73 y.o. female    Chief Complaints: Heel is feeling better but knee hurts    History of Present Illness:Patient was seen on 2/15/2024 reporting that she had a history of plantar fasciitis about 5 years  prior and had an injection done by Dr. May with relief. She  had had recurrence of significant symptoms in the left hindfoot about 4 weeks prior to appointment  with stabbing pain in the plantar aspect of her left heel. symptoms were worse when she first got up in the morning and improved some after ambulating but never resolved completely and was exacerbated again after arising from prolonged seated position. She did not describe any numbness or tingling.     I felt like her pain was consistent with exacerbation of previous plantar fasciitis.  She was instructed on use of a night splint and towel stretch as well as heel cord stretching 4-5 times a day.  She was instructed on use of accommodative shoes with arch support ice bottle massage.  He was also referred to physical therapy and prescribed compounding cream.  She instructed to let us know if symptoms did not improve in 4 to 5 weeks we can get an MRI of her left hindfoot prior to follow-up.    Patient is seen back today stating that her heel is feeling quite a bit better.  She has been doing therapy which she says helps and been stretching and using night splint but does still have pain if she is on her heel for more than 30 to 60 minutes.  She does report 75 to 80% relief.    She however reports that she has had a approximate 8-week history of worsening pain over the inferomedial aspect of the left knee without specific injury other than limping on that side due to her heel pain.  Her knee symptoms are much worse than her heel symptoms at this point.  HPI    ROS: ankle pain  Past Medical History:   Diagnosis Date    Allergic rhinitis     Elevated cholesterol     Family hx of colon  "cancer     Sciatica        Physical Exam:   Vitals:    04/11/24 1417   Temp: 96.9 °F (36.1 °C)   Weight: 69.1 kg (152 lb 6.4 oz)   Height: 152.4 cm (60\")   PainSc:   5     Well developed with normal mood.  On exam she has mild discomfort to palpation at the insertion of the left plantar fascia but no pain medial lateral calcaneal compression and negative Tinel's.  She had moderate discomfort over the medial aspect of the knee over the superomedial aspect of the left proximal tibia but no exacerbation of pain with range of motion.      Radiology: 3 views of the left knee ordered evaluate pain reviewed and no prior x-rays available for comparison there is moderate arthritic change of the left knee less so than on the right.  There is sharpening of the tibial spines and some spurring medially no obvious fracture.      Assessment/Plan: 1.  Improved exacerbation of previous left heel pain consistent with Planter fasciitis with posterior calcaneal spurring as well.  2.  Left knee pain with possible exacerbation of arthritis and/or medial stress fracture    We discussed treatment going forward and will hold off on injection of her knee to get further imaging to make sure there is not a stress fracture.  She was fitted with a knee sleeve and recommend she offload with a walker or at least a cane and limit activity on this.  Will get an MRI of her left knee for further evaluation in order to help tailor treatment protocol and we will see her back in 3 to 4 weeks.    She will continue with stretching exercises for the left heel.  "

## 2024-04-16 ENCOUNTER — TREATMENT (OUTPATIENT)
Age: 74
End: 2024-04-16
Payer: MEDICARE

## 2024-04-16 DIAGNOSIS — M72.2 PLANTAR FASCIITIS OF LEFT FOOT: ICD-10-CM

## 2024-04-16 DIAGNOSIS — M79.672 PAIN IN LEFT FOOT: Primary | ICD-10-CM

## 2024-04-16 DIAGNOSIS — R29.898 ANKLE WEAKNESS: ICD-10-CM

## 2024-04-16 PROCEDURE — 97140 MANUAL THERAPY 1/> REGIONS: CPT | Performed by: PHYSICAL THERAPIST

## 2024-04-16 PROCEDURE — 97110 THERAPEUTIC EXERCISES: CPT | Performed by: PHYSICAL THERAPIST

## 2024-04-16 NOTE — PROGRESS NOTES
"Physical Therapy Daily Treatment Note/Discharge Summary  [address]  [facility] [address]  P: [phone]  F: [phone]    Patient: Denis Lowe   : 1950  Referring practitioner: Solis Wallace*  Date of Initial Visit: Type: THERAPY  Noted: 3/13/2024  Today's Date: 2024  Patient seen for 5 sessions       Visit Diagnoses:    ICD-10-CM ICD-9-CM   1. Pain in left foot  M79.672 729.5   2. Plantar fasciitis of left foot  M72.2 728.71   3. Ankle weakness  R29.898 719.67       Denis Lowe reports:     Subjective   Pt reports that her heel is feeling \"better\" \"I mean I couldn't walk barefoot, but it is feeling good\". Pt reports sharp pain still in her left knee and she reports she saw her doctor and that they are scheduling an MRI.   Objective   See Exercise, Manual, and Modality Logs for complete treatment.   Pt ambulates in and out of clinic without AD, wearing knee sleeve on L knee    Assessment/Plan  Verbal cueing to improve great toe extension with seated heel raise instead of rocking forward without great toe extension. Pt corrects with two verbal cues and demonstration. Pt given increased resistance band in order to progress her left ankle four way activity to strengthen ankle musculature. She demonstrates good understanding of her home exercise program. Pt limited to seated interventions today due to complaints of left knee pain. Pt would like to today to be her last day due to feeling better with her heel/foot pain, until she gets her left knee MRI and evaluated by MD. Pt reports \"my heel feels much better\". Pt instructed to perform home exercise program to maintain progress made in therapy. Pt report that her heel feels 95% better. She reports that she wore a pair of flats on  to [employer] and it didn't bother it at all. Pt will follow up with PT in the future if needed, but will d/c today with home exercise program. She will also follow up in the " future with her left knee if needed after MRI and MD assessment. D/c prognosis for left foot/heel pain is good.     Goals (re-assessed on  visit, pt discharging today-no new changes in goals)  Plan Goals: STG 2-4 weeks     1. LEFS score to increase by 10 points in order to show improvement in overall functional activities. NOT MET (limited by recent c/o L knee pain)  2. Pt will be able to improve ankle ROM to 5 degrees or greater of L ankle dorsiflexion in order to improve gait and stair negotiation MET  3. Pt will be independent in home exercise program MET     LTG 4-8 weeks     1. Pt will be able to ambulate 1.0-1.5 mph for 1/4 mile pain free and with improved stance time on L foot DNT due to knee pain   2. Pt will improve L ankle MMT to 4+/5 or greater to improve stability when ambulating on level and uneven surfaces progressing  3. Pt will report pain free sitting for up to one hour while reading MET  4. LEFS score to increase by 15 points in order to show improvement in overall functional activities. NOT MET (limited by recent c/o L knee pain)    Timed:         Manual Therapy:    10     mins  78688;     Therapeutic Exercise:    15     mins  56526;     Neuromuscular Gifty:    0    mins  05933;    Therapeutic Activity:     0     mins  38592;     Gait Trainin     mins  80945;     Ultrasound:     0     mins  95319;    Ionto                               0    mins   75944  Self Care                       0     mins   75006  Canalith Repos    0     mins 44218      Un-Timed:  Electrical Stimulation:    0     mins  14358 ( );  Dry Needling     0     mins self-pay  Traction     0     mins 90589      Timed Treatment:   25   mins   Total Treatment:     25   mins    Suzy Torres PT  [place] License: [id number]

## 2024-04-17 ENCOUNTER — TELEPHONE (OUTPATIENT)
Dept: ORTHOPEDIC SURGERY | Facility: CLINIC | Age: 74
End: 2024-04-17
Payer: MEDICARE

## 2024-04-17 NOTE — TELEPHONE ENCOUNTER
CONFIRMED WITH RADHA DENSON THAT MRI RESULTS SHOULD BE ALL GOOD FOR NEXT DAY APPT SINCE IT IS AT [facility]. SPOKE WITH PATIENT INFORMING HER OF THIS.

## 2024-04-17 NOTE — TELEPHONE ENCOUNTER
Provider: TIM    Caller: PATIENT    Phone Number: [phone]    Reason for Call: PATIENT WASN'T ABLE TO GET HER MRI SCHEDULED UNTIL 05/01/24. SHE IS ASKING IF THE FOLLOW UP SHE HAS ON 05/02/24 NEEDS TO BE PUSHED OUT. PLEASE CALL HER TO DISCUSS.

## 2024-05-01 ENCOUNTER — HOSPITAL ENCOUNTER (OUTPATIENT)
Dept: MRI IMAGING | Facility: HOSPITAL | Age: 74
Discharge: HOME | End: 2024-05-01
Admitting: ORTHOPAEDIC SURGERY
Payer: MEDICARE

## 2024-05-01 DIAGNOSIS — M17.12 PRIMARY LOCALIZED OSTEOARTHROSIS OF LEFT LOWER LEG: ICD-10-CM

## 2024-05-01 PROCEDURE — 73721 MRI JNT OF LWR EXTRE W/O DYE: CPT

## 2024-05-02 ENCOUNTER — OFFICE VISIT (OUTPATIENT)
Dept: ORTHOPEDIC SURGERY | Facility: CLINIC | Age: 74
End: 2024-05-02
Payer: MEDICARE

## 2024-05-02 VITALS — TEMPERATURE: 97.3 F | HEIGHT: 60 IN | WEIGHT: 152.8 LBS | BODY MASS INDEX: 30 KG/M2

## 2024-05-02 DIAGNOSIS — S83.232D COMPLEX TEAR OF MEDIAL MENISCUS OF LEFT KNEE AS CURRENT INJURY, SUBSEQUENT ENCOUNTER: Primary | ICD-10-CM

## 2024-05-02 DIAGNOSIS — M94.262 CHONDROMALACIA OF LEFT KNEE: ICD-10-CM

## 2024-05-02 DIAGNOSIS — S83.272D COMPLEX TEAR OF LATERAL MENISCUS OF LEFT KNEE AS CURRENT INJURY, SUBSEQUENT ENCOUNTER: ICD-10-CM

## 2024-05-02 DIAGNOSIS — M65.28 CALCIFIC ACHILLES TENDONITIS: ICD-10-CM

## 2024-05-02 DIAGNOSIS — M72.2 PLANTAR FASCIITIS: ICD-10-CM

## 2024-05-02 PROBLEM — S83.232A COMPLEX TEAR OF MEDIAL MENISCUS OF LEFT KNEE AS CURRENT INJURY: Status: ACTIVE | Noted: 2024-05-02

## 2024-05-02 PROBLEM — S83.272A COMPLEX TEAR OF LATERAL MENISCUS OF LEFT KNEE AS CURRENT INJURY: Status: ACTIVE | Noted: 2024-05-02

## 2024-05-02 PROBLEM — M76.60 CALCIFIC ACHILLES TENDONITIS: Status: ACTIVE | Noted: 2024-05-02

## 2024-05-02 NOTE — PROGRESS NOTES
"Knee/Ankle Follow Up      Patient: Denis Lowe    YOB: 1950 73 y.o. female    Chief Complaints: Foot feels a lot better but knee still \"hurts like the [place]\"    History of Present Illness:Patient was seen on 2/15/2024 reporting that she had a history of plantar fasciitis about 5 years  prior and had an injection done by Dr. May with relief. She  had had recurrence of significant symptoms in the left hindfoot about 4 weeks prior to appointment  with stabbing pain in the plantar aspect of her left heel. symptoms were worse when she first got up in the morning and improved some after ambulating but never resolved completely and was exacerbated again after arising from prolonged seated position. She did not describe any numbness or tingling.      I felt like her pain was consistent with exacerbation of previous plantar fasciitis.  She was instructed on use of a night splint and towel stretch as well as heel cord stretching 4-5 times a day.  She was instructed on use of accommodative shoes with arch support ice bottle massage.  He was also referred to physical therapy and prescribed compounding cream.  She instructed to let us know if symptoms did not improve in 4 to 5 weeks we can get an MRI of her left hindfoot prior to follow-up.     Patient was seen on 4/11/2024 stating that her heel was feeling quite a bit better.  She had been doing therapy which she reported helped and had been stretching and using night splint but did still have pain if she was on her heel for more than 30 to 60 minutes.  She did report 75 to 80% relief.     She however reported that she had had a approximate 8-week history of worsening pain over the inferomedial aspect of the left knee without specific injury other than limping on that side due to her heel pain.  Her knee symptoms are much worse than her heel symptoms at that point.    She was instructed continue with stretching exercises for the left heel and was " "encouraged that was improving.  We held off on injection of her need to get further imaging to make sure there was not a stress fracture.  She was fitted with a knee sleeve and recommended offloading with a walker or at least a cane and limit activity.    Patient is seen back today stating that her foot continues to feel a lot better but still quite a bit of pain in the left knee with swelling popping and catching.  She been using a knee sleeve      HPI    ROS: Knee pain  Past Medical History:   Diagnosis Date    Allergic rhinitis     Elevated cholesterol     Family hx of colon cancer     Sciatica        Physical Exam:   Vitals:    05/02/24 1407   Temp: 97.3 °F (36.3 °C)   Weight: 69.3 kg (152 lb 12.8 oz)   Height: 152.4 cm (60\")   PainSc:   8   PainLoc: Foot     Well developed with normal mood.  On exam she really did not have focal tenderness at the insertion of the left plantar heel.    Left knee showed mild effusion no warmth or erythema she had full extension and about 118 degrees of flexion.  There was moderate discomfort of the medial and lateral joint lines worse with Gemma's.      Radiology: MRI films and report of the left knee dated 5/1/2024 reviewed which shows complex degenerative tear of the medial meniscal body exhibiting partial medial extrusion as well as volume loss.  There is degenerative blunting of the free edges of the anterior and posterior horns of the medial meniscus    There is a lateral meniscal tear with tear extending horizontally to the anterior horn which is contiguous with a steep oblique tear of the lateral meniscal body extending into the posterior root and horn.    Moderate medial compartment articular cartilage thinning with mild subchondral marrow edema of the medial femoral condyle and mild lateral and patellofemoral compartment thinning.  Small effusion with small Baker's cyst.      Assessment/Plan:.  1.  Left knee medial and lateral meniscal tears  2.  Left knee moderate " articular cartilage thinning of the medial compartment and mild lateral and patellofemoral compartment cartilage thinning with small Baker's cyst.  3.  Improved exacerbation of previous left heel pain consistent with Planter fasciitis with posterior calcaneal spurring    We discussed treatment going forward and she is having persistent symptoms in the left knee.  We discussed nonoperative treatment with injection and therapy as well as arthroscopic debridement.  She had previous right knee scope done by Dr. Cox in the remote past and wishes to pursue operative treatment understanding that arthritic symptoms could be made worse and could eventually require knee replacement.    I have discussed operative and non-operative treatment options and although no guarantees could be given,  pt would like to proceed with operative treatment. Plan is for knee scope with debridement as needed. We discussed the  procedure in detail including use of a model as well as  post op protocol. Risks, benefits, potential outcomes, and complications were reviewed and can include but are not limited to heart attack, stroke, death, pneumonia, infection, bleeding, damage to blood vessels, nerves, or tendons, blood clot, pulmonary embolus,persistent or worsening symptoms, stiffness, need for subsequent surgery, and failure to return to presurgery and precondition levels of activity.Pt voiced a clear understanding of these. This will be scheduled on an outpatient basis at a mutually convenient time. Pt was encouraged to call with any questions prior to surgery.      She has a walker that she can use for postoperative mobilization.  She has an allergy to codeine which causes lung spasm but does recall that she may have had some pain medication in the past after wrist surgery and is going to see what she took at that point and will discuss pain medications with her day of surgery.

## 2024-05-28 ENCOUNTER — TELEPHONE (OUTPATIENT)
Dept: ORTHOPEDIC SURGERY | Facility: CLINIC | Age: 74
End: 2024-05-28
Payer: MEDICARE

## 2024-05-28 NOTE — TELEPHONE ENCOUNTER
Patient called and LVM stating she thinks she wants to cancel her 6/4/24 and try an injection first. She stated she would like to discuss this with Dr. Wallace today if possible. Okay to LVM if needed, also okay to speak to patient's  Jamil at home phone. Patient will be working until 1:00 today. I called home phone and spoke to Jamil to let him know I received Denis's message and will relay it to Dr. Wallace, advising he is in OR today and there will likely be a delay in response.

## 2024-05-28 NOTE — TELEPHONE ENCOUNTER
I spoke with patient and she has reconsidered proceeding with surgery which I do not think is unreasonable and she would like to proceed with further evaluation and injection as we had discussed.  She has not had any worsening symptoms I think is a reasonable approach.  Will schedule her when schedule allows for further evaluation and steroid injection of her knee.  She appreciated the call

## 2024-06-03 ENCOUNTER — OFFICE VISIT (OUTPATIENT)
Dept: ORTHOPEDIC SURGERY | Facility: CLINIC | Age: 74
End: 2024-06-03
Payer: MEDICARE

## 2024-06-03 VITALS — TEMPERATURE: 96.8 F | WEIGHT: 151.4 LBS | BODY MASS INDEX: 29.72 KG/M2 | HEIGHT: 60 IN

## 2024-06-03 DIAGNOSIS — S83.272D COMPLEX TEAR OF LATERAL MENISCUS OF LEFT KNEE AS CURRENT INJURY, SUBSEQUENT ENCOUNTER: ICD-10-CM

## 2024-06-03 DIAGNOSIS — M17.12 PRIMARY LOCALIZED OSTEOARTHROSIS OF LEFT LOWER LEG: Primary | ICD-10-CM

## 2024-06-03 DIAGNOSIS — M65.28 CALCIFIC ACHILLES TENDONITIS: ICD-10-CM

## 2024-06-03 DIAGNOSIS — M94.262 CHONDROMALACIA OF LEFT KNEE: ICD-10-CM

## 2024-06-03 DIAGNOSIS — S83.232D COMPLEX TEAR OF MEDIAL MENISCUS OF LEFT KNEE AS CURRENT INJURY, SUBSEQUENT ENCOUNTER: ICD-10-CM

## 2024-06-03 DIAGNOSIS — M72.2 PLANTAR FASCIITIS: ICD-10-CM

## 2024-06-03 RX ADMIN — LIDOCAINE HYDROCHLORIDE 4 ML: 10 INJECTION, SOLUTION EPIDURAL; INFILTRATION; INTRACAUDAL; PERINEURAL at 13:40

## 2024-06-03 RX ADMIN — METHYLPREDNISOLONE ACETATE 80 MG: 80 INJECTION, SUSPENSION INTRA-ARTICULAR; INTRALESIONAL; INTRAMUSCULAR; SOFT TISSUE at 13:40

## 2024-06-03 NOTE — PROGRESS NOTES
Foot/Knee Exam      Patient: Denis Lowe    YOB: 1950 73 y.o. female    Chief Complaints: knee hurts    History of Present Illness:Patient was seen on 2/15/2024 reporting that she had a history of plantar fasciitis about 5 years  prior and had an injection done by Dr. May with relief. She  had had recurrence of significant symptoms in the left hindfoot about 4 weeks prior to appointment  with stabbing pain in the plantar aspect of her left heel. symptoms were worse when she first got up in the morning and improved some after ambulating but never resolved completely and was exacerbated again after arising from prolonged seated position. She did not describe any numbness or tingling.      I felt like her pain was consistent with exacerbation of previous plantar fasciitis.  She was instructed on use of a night splint and towel stretch as well as heel cord stretching 4-5 times a day.  She was instructed on use of accommodative shoes with arch support ice bottle massage.  He was also referred to physical therapy and prescribed compounding cream.  She instructed to let us know if symptoms did not improve in 4 to 5 weeks we can get an MRI of her left hindfoot prior to follow-up.     Patient was seen on 4/11/2024 stating that her heel was feeling quite a bit better.  She had been doing therapy which she reported helped and had been stretching and using night splint but did still have pain if she was on her heel for more than 30 to 60 minutes.  She did report 75 to 80% relief.     She however reported that she had had a approximate 8-week history of worsening pain over the inferomedial aspect of the left knee without specific injury other than limping on that side due to her heel pain.  Her knee symptoms are much worse than her heel symptoms at that point.     She was instructed continue with stretching exercises for the left heel and was encouraged that was improving.  We held off on injection of  "her need to get further imaging to make sure there was not a stress fracture.  She was fitted with a knee sleeve and recommended offloading with a walker or at least a cane and limit activity.     Patient was seen on 5/2/2024 stating that her foot continues to feel a lot better but still has had quite a bit of pain in the left knee with swelling popping and catching.  She had been using a knee sleeve.    We reviewed options at that time and plan left knee arthroscopy with debridement however after further discussion on the telephone subsequent to that she decided she did not wish to pursue arthroscopic treatment and wished to have reevaluation and possible injection.    Patient is seen back today and using a knee sleeve which helps some as does the compounding cream.  She gets intermittent sharp pain over the medial and lateral aspect of the right knee.  She reports her heel feels good and only gets an occasional twinge.     HPI    ROS: knee pain  Past Medical History:   Diagnosis Date   • Allergic rhinitis    • Elevated cholesterol    • Family hx of colon cancer    • Sciatica        Physical Exam:   Vitals:    06/03/24 1326   Temp: 96.8 °F (36 °C)   Weight: 68.7 kg (151 lb 6.4 oz)   Height: 152.4 cm (60\")   PainSc:   2     Well developed with normal mood.  She has moderate discomfort about the left knee with tenderness palpation of the medial and lateral joint line exacerbated some with Gemma's but no donato popping.  She has good range of motion with some discomfort on patellofemoral compression.      Radiology: MRI films and report of the left knee dated 5/1/2024 reviewed which shows complex degenerative tear of the medial meniscal body exhibiting partial medial extrusion as well as volume loss.  There is degenerative blunting of the free edges of the anterior and posterior horns of the medial meniscus     There is a lateral meniscal tear with tear extending horizontally to the anterior horn which is contiguous " with a steep oblique tear of the lateral meniscal body extending into the posterior root and horn.     Moderate medial compartment articular cartilage thinning with mild subchondral marrow edema of the medial femoral condyle and mild lateral and patellofemoral compartment thinning.  Small effusion with small Baker's cyst.        Assessment/Plan:.  1.  Left knee medial and lateral meniscal tears  2.  Left knee moderate articular cartilage thinning of the medial compartment and mild lateral and patellofemoral compartment cartilage thinning with small Baker's cyst.  3.  Improved exacerbation of previous left heel pain consistent with Planter fasciitis with posterior calcaneal spurring      We discussed treatment going forward and she does not wish to pursue arthroscopy of the left knee at this time.  We discussed other treatment options and she will continue with compounding cream and knee sleeve and after verbal consent and sterile preparation with discussion of risks which can include infection left knee was injected with steroid lidocaine mixture and postinjection instructions were reviewed.    Will get her started into physical therapy as well    She will continue with stretching exercises for her heel and we will see her back in 4 to 6 weeks to assess progress and determine treatment course going forward.    Large Joint Arthrocentesis: L knee  Date/Time: 6/3/2024 1:40 PM  Consent given by: patient  Site marked: site marked  Timeout: Immediately prior to procedure a time out was called to verify the correct patient, procedure, equipment, support staff and site/side marked as required   Supporting Documentation  Indications: pain   Procedure Details  Location: knee - L knee  Preparation: Patient was prepped and draped in the usual sterile fashion  Needle size: 22 G  Approach: anteromedial  Medications administered: 80 mg methylPREDNISolone acetate 80 MG/ML; 4 mL lidocaine PF 1% 1 %  Patient tolerance: patient  tolerated the procedure well with no immediate complications

## 2024-06-04 RX ORDER — METHYLPREDNISOLONE ACETATE 80 MG/ML
80 INJECTION, SUSPENSION INTRA-ARTICULAR; INTRALESIONAL; INTRAMUSCULAR; SOFT TISSUE
Status: COMPLETED | OUTPATIENT
Start: 2024-06-03 | End: 2024-06-03

## 2024-06-04 RX ORDER — LIDOCAINE HYDROCHLORIDE 10 MG/ML
4 INJECTION, SOLUTION EPIDURAL; INFILTRATION; INTRACAUDAL; PERINEURAL
Status: COMPLETED | OUTPATIENT
Start: 2024-06-03 | End: 2024-06-03

## 2024-07-11 ENCOUNTER — OFFICE VISIT (OUTPATIENT)
Dept: ORTHOPEDIC SURGERY | Facility: CLINIC | Age: 74
End: 2024-07-11
Payer: MEDICARE

## 2024-07-11 VITALS — TEMPERATURE: 97.5 F | WEIGHT: 152.8 LBS | BODY MASS INDEX: 30 KG/M2 | HEIGHT: 60 IN

## 2024-07-11 DIAGNOSIS — M94.262 CHONDROMALACIA OF LEFT KNEE: Primary | ICD-10-CM

## 2024-07-11 DIAGNOSIS — S83.232D COMPLEX TEAR OF MEDIAL MENISCUS OF LEFT KNEE AS CURRENT INJURY, SUBSEQUENT ENCOUNTER: ICD-10-CM

## 2024-07-11 DIAGNOSIS — M65.28 CALCIFIC ACHILLES TENDONITIS: ICD-10-CM

## 2024-07-11 DIAGNOSIS — M72.2 PLANTAR FASCIITIS: ICD-10-CM

## 2024-07-11 DIAGNOSIS — S83.272D COMPLEX TEAR OF LATERAL MENISCUS OF LEFT KNEE AS CURRENT INJURY, SUBSEQUENT ENCOUNTER: ICD-10-CM

## 2024-07-11 NOTE — PROGRESS NOTES
"Foot/Knee Exam      Patient: Denis Lowe    YOB: 1950 73 y.o. female    Chief Complaints: knee \"just wonderful\"    History of Present Illness:Patient was seen on 2/15/2024 reporting that she had a history of plantar fasciitis about 5 years  prior and had an injection done by Dr. May with relief. She  had had recurrence of significant symptoms in the left hindfoot about 4 weeks prior to appointment  with stabbing pain in the plantar aspect of her left heel. symptoms were worse when she first got up in the morning and improved some after ambulating but never resolved completely and was exacerbated again after arising from prolonged seated position. She did not describe any numbness or tingling.      I felt like her pain was consistent with exacerbation of previous plantar fasciitis.  She was instructed on use of a night splint and towel stretch as well as heel cord stretching 4-5 times a day.  She was instructed on use of accommodative shoes with arch support ice bottle massage.  He was also referred to physical therapy and prescribed compounding cream.  She instructed to let us know if symptoms did not improve in 4 to 5 weeks we can get an MRI of her left hindfoot prior to follow-up.     Patient was seen on 4/11/2024 stating that her heel was feeling quite a bit better.  She had been doing therapy which she reported helped and had been stretching and using night splint but did still have pain if she was on her heel for more than 30 to 60 minutes.  She did report 75 to 80% relief.     She however reported that she had had a approximate 8-week history of worsening pain over the inferomedial aspect of the left knee without specific injury other than limping on that side due to her heel pain.  Her knee symptoms are much worse than her heel symptoms at that point.     She was instructed continue with stretching exercises for the left heel and was encouraged that was improving.  We held off on " "injection of her need to get further imaging to make sure there was not a stress fracture.  She was fitted with a knee sleeve and recommended offloading with a walker or at least a cane and limit activity.     Patient was seen on 5/2/2024 stating that her foot continues to feel a lot better but still has had quite a bit of pain in the left knee with swelling popping and catching.  She had been using a knee sleeve.     We reviewed options at that time and had planned on left knee arthroscopy with debridement however after further discussion on the telephone subsequent to that she decided she did not wish to pursue arthroscopic treatment and wished to have reevaluation and possible injection.     Patient was seen on 6/3/2024 and was using a knee sleeve which helped some as did the compounding cream.  She had intermittent sharp pain over the medial and lateral aspect of the right knee.  She ported that her heel felt good and only got an occasional twinge.    We discussed treatment she did not wish to pursue arthroscopy of the knee.  We discussed other treatment options and she was to continue with compounding cream and knee sleeve and left knee was injected with steroid lidocaine mixture and she was referred to physical therapy.    Patient is seen back today stating that her knee is \"wonderful\".  The injection helped.  She does still feel a little of stiffness and a little sensitivity over the medial joint line but none of the \"dagger feeling\" that she had previously.  She has been able to scrub her bathrooms.  She does get some fatigue in the knee if she is on it a lot but this improves with ice and compounding cream and indicated takes a half gabapentin at night to get some rest.  She did not do any therapy due to difficulty with scheduling this and by the time she was able to get in her knee pain was improved and she held off on therapy she been doing a home exercise with bands and a seated peddler.  Pain is rated 3 " "out of 10     HPI    ROS: knee pain  Past Medical History:   Diagnosis Date    Allergic rhinitis     Elevated cholesterol     Family hx of colon cancer     Sciatica        Physical Exam:   Vitals:    07/11/24 1413   Temp: 97.5 °F (36.4 °C)   Weight: 69.3 kg (152 lb 12.8 oz)   Height: 152.4 cm (60\")   PainSc: 0-No pain     Well developed with normal mood.  On exam she had a nonantalgic gait.  She had minimal if any discomfort of the medial joint line no exacerbation of pain medially or laterally with Gemma's she had good range of motion.      Radiology:MRI films and report of the left knee dated 5/1/2024 reviewed which shows complex degenerative tear of the medial meniscal body exhibiting partial medial extrusion as well as volume loss.  There is degenerative blunting of the free edges of the anterior and posterior horns of the medial meniscus     There is a lateral meniscal tear with tear extending horizontally to the anterior horn which is contiguous with a steep oblique tear of the lateral meniscal body extending into the posterior root and horn.     Moderate medial compartment articular cartilage thinning with mild subchondral marrow edema of the medial femoral condyle and mild lateral and patellofemoral compartment thinning.  Small effusion with small Baker's cyst.        Assessment/Plan:.    1.  Left knee medial and lateral meniscal tears  2.  Left knee moderate articular cartilage thinning of the medial compartment and mild lateral and patellofemoral compartment cartilage thinning with small Baker's cyst.  3.  Improved exacerbation of previous left heel pain consistent with Planter fasciitis with posterior calcaneal spurring    We discussed treatment going forward overall seems to be markedly improved and not having mechanical symptoms and would not recommend pursuing surgical treatment at this time as long as she can be managed with intermittent injections we will continue as such.    She will continue " with strengthening exercises and use her knee sleeve as needed as well as compounding cream and if she has persistent worsening symptoms she will schedule follow-up otherwise by mutual agreement I will see her back as needed

## 2024-10-02 ENCOUNTER — OFFICE VISIT (OUTPATIENT)
Dept: ORTHOPEDIC SURGERY | Facility: CLINIC | Age: 74
End: 2024-10-02
Payer: MEDICARE

## 2024-10-02 VITALS — BODY MASS INDEX: 29.27 KG/M2 | WEIGHT: 155 LBS | HEIGHT: 61 IN | TEMPERATURE: 97.7 F

## 2024-10-02 DIAGNOSIS — R52 PAIN: ICD-10-CM

## 2024-10-02 DIAGNOSIS — M84.369A STRESS FRACTURE OF KNEE: Primary | ICD-10-CM

## 2024-10-02 DIAGNOSIS — S83.232D COMPLEX TEAR OF MEDIAL MENISCUS OF LEFT KNEE AS CURRENT INJURY, SUBSEQUENT ENCOUNTER: ICD-10-CM

## 2024-10-02 DIAGNOSIS — M94.262 CHONDROMALACIA OF LEFT KNEE: ICD-10-CM

## 2024-10-02 DIAGNOSIS — S83.272D COMPLEX TEAR OF LATERAL MENISCUS OF LEFT KNEE AS CURRENT INJURY, SUBSEQUENT ENCOUNTER: ICD-10-CM

## 2024-10-02 RX ORDER — FENOFIBRATE 54 MG/1
54 TABLET ORAL DAILY
COMMUNITY
Start: 2024-09-09

## 2024-10-02 RX ORDER — LISINOPRIL 10 MG/1
10 TABLET ORAL DAILY
COMMUNITY
Start: 2024-09-17 | End: 2025-09-17

## 2024-10-02 RX ORDER — DICLOFENAC SODIUM 75 MG/1
75 TABLET, DELAYED RELEASE ORAL 2 TIMES DAILY
Qty: 60 TABLET | Refills: 1 | Status: SHIPPED | OUTPATIENT
Start: 2024-10-02

## 2024-10-02 NOTE — PROGRESS NOTES
Knee Exam      Patient: Denis Lowe    YOB: 1950 74 y.o. female    Chief Complaints: knee hurts    History of Present Illness::Patient was seen on 2/15/2024 reporting that she had a history of plantar fasciitis about 5 years  prior and had an injection done by Dr. May with relief. She  had had recurrence of significant symptoms in the left hindfoot about 4 weeks prior to appointment  with stabbing pain in the plantar aspect of her left heel. symptoms were worse when she first got up in the morning and improved some after ambulating but never resolved completely and was exacerbated again after arising from prolonged seated position. She did not describe any numbness or tingling.      I felt like her pain was consistent with exacerbation of previous plantar fasciitis.  She was instructed on use of a night splint and towel stretch as well as heel cord stretching 4-5 times a day.  She was instructed on use of accommodative shoes with arch support ice bottle massage.  He was also referred to physical therapy and prescribed compounding cream.  She instructed to let us know if symptoms did not improve in 4 to 5 weeks we can get an MRI of her left hindfoot prior to follow-up.     Patient was seen on 4/11/2024 stating that her heel was feeling quite a bit better.  She had been doing therapy which she reported helped and had been stretching and using night splint but did still have pain if she was on her heel for more than 30 to 60 minutes.  She did report 75 to 80% relief.     She however reported that she had had a approximate 8-week history of worsening pain over the inferomedial aspect of the left knee without specific injury other than limping on that side due to her heel pain.  Her knee symptoms are much worse than her heel symptoms at that point.     She was instructed continue with stretching exercises for the left heel and was encouraged that was improving.  We held off on injection of her  "need to get further imaging to make sure there was not a stress fracture.  She was fitted with a knee sleeve and recommended offloading with a walker or at least a cane and limit activity.     Patient was seen on 5/2/2024 stating that her foot continues to feel a lot better but still has had quite a bit of pain in the left knee with swelling popping and catching.  She had been using a knee sleeve.     We reviewed options at that time and had planned on left knee arthroscopy with debridement however after further discussion on the telephone subsequent to that she decided she did not wish to pursue arthroscopic treatment and wished to have reevaluation and possible injection.     Patient was seen on 6/3/2024 and was using a knee sleeve which helped some as did the compounding cream.  She had intermittent sharp pain over the medial and lateral aspect of the right knee.  She ported that her heel felt good and only got an occasional twinge.     We discussed treatment she did not wish to pursue arthroscopy of the knee.  We discussed other treatment options and she was to continue with compounding cream and knee sleeve and left knee was injected with steroid lidocaine mixture and she was referred to physical therapy.     Patient seen on 7/11/2024 stating that her knee was \"wonderful\".  The had injection helped.  She did still feel a little of stiffness and a little sensitivity over the medial joint line but none of the \"dagger feeling\" that she had had previously.  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  She did get some fatigue in the knee if she was on it a lot but this improved with ice and compounding cream and patiently took a half gabapentin at night to get some rest.  She did not do any therapy due to difficulty with scheduling this and by the time she was able to get in her knee pain was improved and she held off on therapy.  She had been doing a home exercise with bands and a seated peddler.  Pain was rated 0 " "out of 10.    Patient is seen back today reporting that subsequent to the previous visit she has had worsening pain over the medial aspect of her knee.  Is not so much of the joint line but over the medial proximal tibia.  Is worse with standing and walking and become somewhat debilitating.  She has a cane and walker but has not really been using it but has been using some knee braces.  She has been using an extensive amount of ibuprofen without relief.  Pain is rated at 8 out of 10.  She reports that her heel pain has resolved and is \"fine\"  HPI    ROS: knee pain  Past Medical History:   Diagnosis Date    Allergic rhinitis     Elevated cholesterol     Family hx of colon cancer     Sciatica        Physical Exam:   Vitals:    10/02/24 1119   Temp: 97.7 °F (36.5 °C)   Weight: 70.3 kg (155 lb)   Height: 154.9 cm (61\")   PainSc:   8   PainLoc: Foot     Well developed with normal mood.  On exam she has somewhat of a limp.  She has moderate tenderness over the medial aspect of the proximal tibia more so than the medial joint line with some discomfort on Gemma's.  She had good range of motion      Radiology:  3 views of the left knee ordered evaluate pain and alignment reviewed and compared to previous x-rays.  There is narrowing of the medial joint line and somewhat more prominent than on previous x-rays.  No clear evidence of fracture.      MRI films and report of the left knee dated 5/1/2024 reviewed which shows complex degenerative tear of the medial meniscal body exhibiting partial medial extrusion as well as volume loss.  There is degenerative blunting of the free edges of the anterior and posterior horns of the medial meniscus     There is a lateral meniscal tear with tear extending horizontally to the anterior horn which is contiguous with a steep oblique tear of the lateral meniscal body extending into the posterior root and horn.     Moderate medial compartment articular cartilage thinning with mild " subchondral marrow edema of the medial femoral condyle and mild lateral and patellofemoral compartment thinning.  Small effusion with small Baker's cyst.        Assessment/Plan:.     1.  Left knee medial and lateral meniscal tears  2.  Left knee moderate articular cartilage thinning of the medial compartment and mild lateral and patellofemoral compartment cartilage thinning with small Baker's cyst.  3.  Improved exacerbation of previous left heel pain consistent with Planter fasciitis with posterior calcaneal spurring    We discussed treatment going forward and she has had significant exacerbation of left knee pain.    I am worried with her symptoms that in addition to meniscal tear she may have developed a stress fracture that may alter treatment protocol.    Encouraged her to use a knee sleeve and encouraged her to offload this with a cane although she says she has had difficulty using a cane and tends to trip over it and recommended use of a walker.    Will have her stop taking the ibuprofen and try Voltaren SR 75 mg every 12 hours with GI precautions and recommend offloading with a walker    I need to get a new MRI of her left knee to evaluate for stress fracture that may alter treatment protocol and we will see her back in about 3 weeks to review MRI and determine treatment course going forward.

## 2024-10-04 ENCOUNTER — TELEPHONE (OUTPATIENT)
Dept: ORTHOPEDIC SURGERY | Facility: CLINIC | Age: 74
End: 2024-10-04
Payer: MEDICARE

## 2024-10-09 ENCOUNTER — TELEPHONE (OUTPATIENT)
Dept: ORTHOPEDIC SURGERY | Facility: CLINIC | Age: 74
End: 2024-10-09
Payer: MEDICARE

## 2024-10-09 ENCOUNTER — TELEPHONE (OUTPATIENT)
Dept: ORTHOPEDIC SURGERY | Facility: CLINIC | Age: 74
End: 2024-10-09

## 2024-10-09 NOTE — TELEPHONE ENCOUNTER
Caller: Denis Lowe    Relationship to patient: Self    Best call back number: [phone]    Patient is needing: PT IS RETURNING A MISSED CALL. .THERE WAS NOT A MESSAGE LEFT.. PLEASE ADVISE..

## 2024-10-09 NOTE — TELEPHONE ENCOUNTER
I called and spoke with patient on MRI results.  She said her knee is better than it had been some days better than others and feels like the anti-inflammatories are helping.  Reviewed her MRI results with her and nothing I would recommend from a surgical standpoint at this time and wanted to make sure she did not see any evidence of stress fracture.  She will continue with anti-inflammatories as well as offloading with a cane or walker as needed and use of a knee sleeve or brace as needed.  We had a pleasant conversation will see her back as scheduled.  She appreciated the call

## 2024-10-28 ENCOUNTER — OFFICE VISIT (OUTPATIENT)
Dept: ORTHOPEDIC SURGERY | Facility: CLINIC | Age: 74
End: 2024-10-28
Payer: MEDICARE

## 2024-10-28 VITALS — HEIGHT: 61 IN | BODY MASS INDEX: 29.11 KG/M2 | TEMPERATURE: 97.5 F | WEIGHT: 154.2 LBS

## 2024-10-28 DIAGNOSIS — M94.262 CHONDROMALACIA OF LEFT KNEE: ICD-10-CM

## 2024-10-28 DIAGNOSIS — S83.412D SPRAIN OF MEDIAL COLLATERAL LIGAMENT OF LEFT KNEE, SUBSEQUENT ENCOUNTER: ICD-10-CM

## 2024-10-28 DIAGNOSIS — S83.272D COMPLEX TEAR OF LATERAL MENISCUS OF LEFT KNEE AS CURRENT INJURY, SUBSEQUENT ENCOUNTER: Primary | ICD-10-CM

## 2024-10-28 DIAGNOSIS — S83.232D COMPLEX TEAR OF MEDIAL MENISCUS OF LEFT KNEE AS CURRENT INJURY, SUBSEQUENT ENCOUNTER: ICD-10-CM

## 2024-10-28 DIAGNOSIS — M70.52 PES ANSERINUS BURSITIS OF LEFT KNEE: ICD-10-CM

## 2024-10-28 RX ORDER — ALBUTEROL SULFATE 2 MG/5ML
4 SYRUP ORAL 3 TIMES DAILY
COMMUNITY
Start: 2024-10-21

## 2024-10-28 NOTE — PROGRESS NOTES
"Knee Exam      Patient: Denis Lowe    YOB: 1950 74 y.o. female    Chief Complaints: knee \"feels much better\"    History of Present Illness:Patient was seen on 2/15/2024 reporting that she had a history of plantar fasciitis about 5 years  prior and had an injection done by Dr. May with relief. She  had had recurrence of significant symptoms in the left hindfoot about 4 weeks prior to appointment  with stabbing pain in the plantar aspect of her left heel. symptoms were worse when she first got up in the morning and improved some after ambulating but never resolved completely and was exacerbated again after arising from prolonged seated position. She did not describe any numbness or tingling.      I felt like her pain was consistent with exacerbation of previous plantar fasciitis.  She was instructed on use of a night splint and towel stretch as well as heel cord stretching 4-5 times a day.  She was instructed on use of accommodative shoes with arch support ice bottle massage.  He was also referred to physical therapy and prescribed compounding cream.  She instructed to let us know if symptoms did not improve in 4 to 5 weeks we can get an MRI of her left hindfoot prior to follow-up.     Patient was seen on 4/11/2024 stating that her heel was feeling quite a bit better.  She had been doing therapy which she reported helped and had been stretching and using night splint but did still have pain if she was on her heel for more than 30 to 60 minutes.  She did report 75 to 80% relief.     She however reported that she had had a approximate 8-week history of worsening pain over the inferomedial aspect of the left knee without specific injury other than limping on that side due to her heel pain.  Her knee symptoms are much worse than her heel symptoms at that point.     She was instructed continue with stretching exercises for the left heel and was encouraged that was improving.  We held off on " "injection of her need to get further imaging to make sure there was not a stress fracture.  She was fitted with a knee sleeve and recommended offloading with a walker or at least a cane and limit activity.     Patient was seen on 5/2/2024 stating that her foot continues to feel a lot better but still has had quite a bit of pain in the left knee with swelling popping and catching.  She had been using a knee sleeve.     We reviewed options at that time and had planned on left knee arthroscopy with debridement however after further discussion on the telephone subsequent to that she decided she did not wish to pursue arthroscopic treatment and wished to have reevaluation and possible injection.     Patient was seen on 6/3/2024 and was using a knee sleeve which helped some as did the compounding cream.  She had intermittent sharp pain over the medial and lateral aspect of the right knee.  She ported that her heel felt good and only got an occasional twinge.     We discussed treatment she did not wish to pursue arthroscopy of the knee.  We discussed other treatment options and she was to continue with compounding cream and knee sleeve and left knee was injected with steroid lidocaine mixture and she was referred to physical therapy.     Patient seen on 7/11/2024 stating that her knee was \"wonderful\".  The had injection helped.  She did still feel a little of stiffness and a little sensitivity over the medial joint line but none of the \"dagger feeling\" that she had had previously.  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  She did get some fatigue in the knee if she was on it a lot but this improved with ice and compounding cream and patiently took a half gabapentin at night to get some rest.  She did not do any therapy due to difficulty with scheduling this and by the time she was able to get in her knee pain was improved and she held off on therapy.  She had been doing a home exercise with bands and a seated peddler.  " "Pain was rated 0 out of 10.     Patient was seen on 10-24 reporting that subsequent to the previous visit she had had worsening pain over the medial aspect of her knee.  Pain was not so much of the joint line but over the medial proximal tibia.  Symptoms were worse with standing and walking and had become somewhat debilitating.  She had a cane and walker but had not really been using it but had been using some knee braces.  She forted that she had been using an extensive amount of ibuprofen without relief.  Pain is rated at 8 out of 10.  She report that her heel pain had resolved and was \"fine.    I was worried in addition to meniscal tears with exacerbation of symptoms that she had developed a stress fracture.  She was encouraged use a knee sleeve and offload with a cane although had difficulty using a cane and tended to trip over it so recommended use of a walker.  I had her stop taking ibuprofen and placed on Voltaren SR 75 mg every 12 hours with GI precautions and sent for new MRI.    Discussed MRI results with patient on 10/9/2024 at which time she reported that her knee had been better than it had been and thought anti-inflammatories (Voltaren SR) were helping.  Reviewed that I did not see any clear sign of stress fracture she was instructed to continue with anti-inflammatories and offloading with a cane or walker and use a knee sleeve or brace as needed.    Patient is seen back today using that she is much better.  She has not been using her walker now for a week or 2 because she does not feel like she needed it.  She is not using her brace.  She is icing 3 times a day and feels that the Voltaren helps.  She also got the compounding cream for like it helped some as well.  She gets some mild pain over the medial aspect of the knee but overall is improved.  HPI    ROS: knee pain  Past Medical History:   Diagnosis Date    Allergic rhinitis     Elevated cholesterol     Family hx of colon cancer     Sciatica  " "      Physical Exam:   Vitals:    10/28/24 1334   Temp: 97.5 °F (36.4 °C)   TempSrc: Temporal   Weight: 69.9 kg (154 lb 3.2 oz)   Height: 154.9 cm (61\")   PainSc:   2   PainLoc: Knee     Well developed with normal mood.  On exam she had minimal tenderness over the medial joint line.  She had good range of motion of the left knee and no gross instability.      Radiology: MRI films and report of the left knee dated 10/4/2024 from [facility] reviewed which show trace fluid in the semimembranosus bursa and subcutaneous edema along the anteromedial aspect of the knee adjacent to the tibial level as well as lateral to the fibula.    The cartilage is severely thinned across the weightbearing femoral condyle and tibial plateau.  Medial meniscus demonstrated a trizonal horizontal tear through the body of meniscus and also communicated with vertical tear of the free edge margin of the posterior horn/body.  Horizontal signal extending into the peripheral aspect of the posterior horn with additional fraying of the free edge margins of the posterior horn    Lateral compartment cartilage was mild to moderately thin to the anterior tibial plateau and moderately thinned at the posterior nonweightbearing femoral condyle.  Lateral meniscus showed degenerative signal throughout the meniscus with obliquely oriented near full-thickness tear of the posterior horn peripheral to the root attachment.  Separative trizonal oblique tear communicating to the inferior articular margin affecting the anterior horn and anterior body    Patellofemoral joint was anatomically aligned with superficial cartilage fissuring and fraying on portions of the median ridge as well as focal cartilage loss present along the medial trochlea over area measuring up to 10 mm.    There is nonspecific grade 1 edema surrounding the MCL      MRI films and report of the left knee dated 5/1/2024 reviewed which shows complex degenerative tear of the medial meniscal body " exhibiting partial medial extrusion as well as volume loss.  There is degenerative blunting of the free edges of the anterior and posterior horns of the medial meniscus     There is a lateral meniscal tear with tear extending horizontally to the anterior horn which is contiguous with a steep oblique tear of the lateral meniscal body extending into the posterior root and horn.     Moderate medial compartment articular cartilage thinning with mild subchondral marrow edema of the medial femoral condyle and mild lateral and patellofemoral compartment thinning.  Small effusion with small Baker's cyst.     Assessment/Plan:.     1.  Left knee medial and latera comp 6 l meniscal tears  2.  Left knee moderate to severe articular cartilage thinning of the medial compartment and mild to moderate lateral and patellofemoral compartment cartilage thinning.  (Baker's cyst previously noted on MRI from 5/1/2024).  3.  Improved exacerbation of previous left heel pain consistent with Planter fasciitis with posterior calcaneal spurring  4.  Left knee nonspecific grade 1 edema surrounding MCL  5.  Left knee trace fluid semimembranosus bursa with subcutaneous edema anteromedial aspect of the adjacent to the level of the knee and lateral to the fibula consistent with possible pes bursitis.    We discussed treatment going forward overall seems to be doing well.  She will use cane as needed and continue with compounding cream.  She will finish her Voltaren orally and if needs additional refills she understands to contact her PCP so that kidney and liver functions can be monitored.    She has persistent worsening symptoms she will let me know otherwise by mutual agreement I will see her back as needed

## 2025-07-08 ENCOUNTER — TELEPHONE (OUTPATIENT)
Dept: GASTROENTEROLOGY | Facility: CLINIC | Age: 75
End: 2025-07-08
Payer: MEDICARE

## 2025-07-08 ENCOUNTER — OFFICE VISIT (OUTPATIENT)
Dept: GASTROENTEROLOGY | Facility: CLINIC | Age: 75
End: 2025-07-08
Payer: MEDICARE

## 2025-07-08 VITALS
SYSTOLIC BLOOD PRESSURE: 146 MMHG | DIASTOLIC BLOOD PRESSURE: 81 MMHG | TEMPERATURE: 97.1 F | BODY MASS INDEX: 28.78 KG/M2 | HEART RATE: 77 BPM | HEIGHT: 60 IN | WEIGHT: 146.6 LBS

## 2025-07-08 DIAGNOSIS — R68.81 EARLY SATIETY: ICD-10-CM

## 2025-07-08 DIAGNOSIS — Z12.11 ENCOUNTER FOR SCREENING FOR MALIGNANT NEOPLASM OF COLON: ICD-10-CM

## 2025-07-08 DIAGNOSIS — R63.0 DECREASED APPETITE: ICD-10-CM

## 2025-07-08 DIAGNOSIS — Z79.1 NSAID LONG-TERM USE: ICD-10-CM

## 2025-07-08 DIAGNOSIS — R10.12 LEFT UPPER QUADRANT ABDOMINAL PAIN: Primary | ICD-10-CM

## 2025-07-08 DIAGNOSIS — Z87.19 HISTORY OF ESOPHAGITIS: ICD-10-CM

## 2025-07-08 PROCEDURE — 1160F RVW MEDS BY RX/DR IN RCRD: CPT

## 2025-07-08 PROCEDURE — 99204 OFFICE O/P NEW MOD 45 MIN: CPT

## 2025-07-08 PROCEDURE — 1159F MED LIST DOCD IN RCRD: CPT

## 2025-07-08 RX ORDER — PANTOPRAZOLE SODIUM 40 MG/1
40 TABLET, DELAYED RELEASE ORAL DAILY
Qty: 90 TABLET | Refills: 0 | Status: SHIPPED | OUTPATIENT
Start: 2025-07-08

## 2025-07-08 NOTE — TELEPHONE ENCOUNTER
CHEYANNE Wright for COLONOSCOPY on 10/28/25  arrive at 6:30  . Gave prep instructions to pt in office ....miralax

## 2025-07-24 ENCOUNTER — TELEPHONE (OUTPATIENT)
Dept: GASTROENTEROLOGY | Facility: CLINIC | Age: 75
End: 2025-07-24
Payer: MEDICARE

## 2025-07-24 NOTE — TELEPHONE ENCOUNTER
Called and lvm about overdue CT abdomen pelvis ordered by Tammy Villalobos. Please call [phone] to schedule. If you have already completed it please let us know when and where so we can obtain your records. If you wish not to proceed with the testing please let us know so we can cancel out the order.    Ok for hub to relay.

## (undated) DEVICE — FRCP BX RADJAW4 NDL 2.8 240CM LG OG BX40

## (undated) DEVICE — BITEBLOCK OMNI BLOC

## (undated) DEVICE — Device: Brand: DEFENDO AIR/WATER/SUCTION AND BIOPSY VALVE

## (undated) DEVICE — CANN NASL CO2 TRULINK W/O2 A/

## (undated) DEVICE — TUBING, SUCTION, 1/4" X 10', STRAIGHT: Brand: MEDLINE